# Patient Record
Sex: FEMALE | Race: WHITE | NOT HISPANIC OR LATINO | Employment: STUDENT | URBAN - METROPOLITAN AREA
[De-identification: names, ages, dates, MRNs, and addresses within clinical notes are randomized per-mention and may not be internally consistent; named-entity substitution may affect disease eponyms.]

---
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
## 2021-04-16 ENCOUNTER — PATIENT MESSAGE (OUTPATIENT)
Dept: RESEARCH | Facility: HOSPITAL | Age: 21
End: 2021-04-16

## 2021-04-28 NOTE — TELEPHONE ENCOUNTER
Please advise.    Pt presents to ED with c/o of right heel pain due to possible overuse x3 months. States pain has not gotten better. pms intact. Resp even and unlabored. A/ox4. No acute distress noted. Denies any need at this time. Call light within reach. Bed in lowest position. Will continue to monitor.

## 2021-12-17 RX ORDER — VALACYCLOVIR HYDROCHLORIDE 1 G/1
2000 TABLET, FILM COATED ORAL 2 TIMES DAILY
Qty: 4 TABLET | Refills: 5 | Status: SHIPPED | OUTPATIENT
Start: 2021-12-17 | End: 2023-01-08 | Stop reason: SDUPTHER

## 2022-08-22 RX ORDER — AZITHROMYCIN 250 MG/1
TABLET, FILM COATED ORAL
Qty: 6 TABLET | Refills: 0 | Status: SHIPPED | OUTPATIENT
Start: 2022-08-22 | End: 2023-06-13

## 2023-01-20 ENCOUNTER — OFFICE VISIT (OUTPATIENT)
Dept: OPHTHALMOLOGY | Facility: CLINIC | Age: 23
End: 2023-01-20
Payer: COMMERCIAL

## 2023-01-20 DIAGNOSIS — Z01.00 ENCOUNTER FOR EYE EXAM: Primary | ICD-10-CM

## 2023-01-20 DIAGNOSIS — H52.13 MYOPIA OF BOTH EYES: ICD-10-CM

## 2023-01-20 PROCEDURE — 92004 PR EYE EXAM, NEW PATIENT,COMPREHESV: ICD-10-PCS | Mod: S$GLB,,, | Performed by: OPTOMETRIST

## 2023-01-20 PROCEDURE — 92015 PR REFRACTION: ICD-10-PCS | Mod: S$GLB,,, | Performed by: OPTOMETRIST

## 2023-01-20 PROCEDURE — 99999 PR PBB SHADOW E&M-EST. PATIENT-LVL II: CPT | Mod: PBBFAC,,, | Performed by: OPTOMETRIST

## 2023-01-20 PROCEDURE — 92004 COMPRE OPH EXAM NEW PT 1/>: CPT | Mod: S$GLB,,, | Performed by: OPTOMETRIST

## 2023-01-20 PROCEDURE — 92015 DETERMINE REFRACTIVE STATE: CPT | Mod: S$GLB,,, | Performed by: OPTOMETRIST

## 2023-01-20 PROCEDURE — 1159F MED LIST DOCD IN RCRD: CPT | Mod: CPTII,S$GLB,, | Performed by: OPTOMETRIST

## 2023-01-20 PROCEDURE — 1159F PR MEDICATION LIST DOCUMENTED IN MEDICAL RECORD: ICD-10-PCS | Mod: CPTII,S$GLB,, | Performed by: OPTOMETRIST

## 2023-01-20 PROCEDURE — 99999 PR PBB SHADOW E&M-EST. PATIENT-LVL II: ICD-10-PCS | Mod: PBBFAC,,, | Performed by: OPTOMETRIST

## 2023-01-20 NOTE — PROGRESS NOTES
HPI     Annual Exam            Comments: NP          Comments    Patient here today for yearly eye exam  Vision changes since last eye exam?: Yes OD blurred  No correction     Any eye pain today: No    Other ocular symptoms: No    Interested in contact lens fitting today? No                      Last edited by Na Lau, PCT on 1/20/2023  8:30 AM.            Assessment /Plan     For exam results, see Encounter Report.    Encounter for eye exam    Myopia of both eyes    Subclinical Mrx, given PRN for distance  Eyeglass Final Rx       Eyeglass Final Rx         Sphere Cylinder Axis    Right -0.75 +0.25 100    Left Fords +0.25 105      Type: SVL    Expiration Date: 1/20/2024                    RTC 1 yr for dilated eye exam or sooner if any changes to vision.   Discussed above and answered questions.

## 2023-06-13 ENCOUNTER — TELEPHONE (OUTPATIENT)
Dept: PRIMARY CARE CLINIC | Facility: CLINIC | Age: 23
End: 2023-06-13

## 2023-06-13 ENCOUNTER — OFFICE VISIT (OUTPATIENT)
Dept: PRIMARY CARE CLINIC | Facility: CLINIC | Age: 23
End: 2023-06-13
Payer: COMMERCIAL

## 2023-06-13 VITALS
DIASTOLIC BLOOD PRESSURE: 62 MMHG | WEIGHT: 143.06 LBS | SYSTOLIC BLOOD PRESSURE: 102 MMHG | OXYGEN SATURATION: 99 % | TEMPERATURE: 99 F | BODY MASS INDEX: 22.99 KG/M2 | HEART RATE: 67 BPM | HEIGHT: 66 IN

## 2023-06-13 DIAGNOSIS — J02.9 SORE THROAT: ICD-10-CM

## 2023-06-13 DIAGNOSIS — H93.8X2 EAR PRESSURE, LEFT: ICD-10-CM

## 2023-06-13 DIAGNOSIS — J03.90 TONSILLITIS: Primary | ICD-10-CM

## 2023-06-13 PROCEDURE — 1159F MED LIST DOCD IN RCRD: CPT | Mod: CPTII,S$GLB,, | Performed by: FAMILY MEDICINE

## 2023-06-13 PROCEDURE — 3074F SYST BP LT 130 MM HG: CPT | Mod: CPTII,S$GLB,, | Performed by: FAMILY MEDICINE

## 2023-06-13 PROCEDURE — 3008F PR BODY MASS INDEX (BMI) DOCUMENTED: ICD-10-PCS | Mod: CPTII,S$GLB,, | Performed by: FAMILY MEDICINE

## 2023-06-13 PROCEDURE — 99214 PR OFFICE/OUTPT VISIT, EST, LEVL IV, 30-39 MIN: ICD-10-PCS | Mod: S$GLB,,, | Performed by: FAMILY MEDICINE

## 2023-06-13 PROCEDURE — 3008F BODY MASS INDEX DOCD: CPT | Mod: CPTII,S$GLB,, | Performed by: FAMILY MEDICINE

## 2023-06-13 PROCEDURE — 99999 PR PBB SHADOW E&M-EST. PATIENT-LVL III: CPT | Mod: PBBFAC,,, | Performed by: FAMILY MEDICINE

## 2023-06-13 PROCEDURE — 3078F DIAST BP <80 MM HG: CPT | Mod: CPTII,S$GLB,, | Performed by: FAMILY MEDICINE

## 2023-06-13 PROCEDURE — 99999 PR PBB SHADOW E&M-EST. PATIENT-LVL III: ICD-10-PCS | Mod: PBBFAC,,, | Performed by: FAMILY MEDICINE

## 2023-06-13 PROCEDURE — 3078F PR MOST RECENT DIASTOLIC BLOOD PRESSURE < 80 MM HG: ICD-10-PCS | Mod: CPTII,S$GLB,, | Performed by: FAMILY MEDICINE

## 2023-06-13 PROCEDURE — 3074F PR MOST RECENT SYSTOLIC BLOOD PRESSURE < 130 MM HG: ICD-10-PCS | Mod: CPTII,S$GLB,, | Performed by: FAMILY MEDICINE

## 2023-06-13 PROCEDURE — 1159F PR MEDICATION LIST DOCUMENTED IN MEDICAL RECORD: ICD-10-PCS | Mod: CPTII,S$GLB,, | Performed by: FAMILY MEDICINE

## 2023-06-13 PROCEDURE — 1160F RVW MEDS BY RX/DR IN RCRD: CPT | Mod: CPTII,S$GLB,, | Performed by: FAMILY MEDICINE

## 2023-06-13 PROCEDURE — 1160F PR REVIEW ALL MEDS BY PRESCRIBER/CLIN PHARMACIST DOCUMENTED: ICD-10-PCS | Mod: CPTII,S$GLB,, | Performed by: FAMILY MEDICINE

## 2023-06-13 PROCEDURE — 99214 OFFICE O/P EST MOD 30 MIN: CPT | Mod: S$GLB,,, | Performed by: FAMILY MEDICINE

## 2023-06-13 RX ORDER — AMOXICILLIN 500 MG/1
500 CAPSULE ORAL 2 TIMES DAILY
Qty: 20 CAPSULE | Refills: 0 | Status: SHIPPED | OUTPATIENT
Start: 2023-06-13 | End: 2023-06-23

## 2023-06-13 RX ORDER — IBUPROFEN 200 MG
200 TABLET ORAL EVERY 6 HOURS PRN
COMMUNITY

## 2023-06-13 NOTE — PROGRESS NOTES
Assessment:     1. Tonsillitis    2. Sore throat    3. Ear pressure, left      Plan:     Orders Placed This Encounter    amoxicillin (AMOXIL) 500 MG capsule   BID x 10d  Come in for evaluation if not better,   To ER if fever, can't swallow - risk of tonsillar abscess    There are no Patient Instructions on file for this visit.    Patient ID: Elizabeth Keyes is a 23 y.o. female.    Here today for sore throat, began Sat, then pressure in L ear. 4d. Took lots of Ibuprofen 6 in a day, & OTC meds. Felt worse this am. No fever. Some pain w swallowing. My tonsil on L  feels like it's swollen. The flap before it feels sore & it was red yesterday.     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 No cough    Current Outpatient Medications   Medication Instructions    amoxicillin (AMOXIL) 500 mg, Oral, 2 times daily    ibuprofen (ADVIL,MOTRIN) 200 mg, Oral, Every 6 hours PRN       No results found for: HGBA1C  No results found for: MICALBCREAT  No results found for: LDLCALC, CHOL, HDL, TRIG    No results found for: NA, K, CL, CO2, GLU, BUN, CREATININE, CALCIUM, PROT, ALBUMIN, BILITOT, ALKPHOS, AST, ALT, ANIONGAP, ESTGFRAFRICA, EGFRNONAA, WBC, HGB, HCT, MCV, PLT, TSH, PSA, PSADIAG, HEPCAB    No results found for: LH, FSH, TOTALTESTOST, PROGESTERONE, ESTRADIOL, [id number], MRGOLCOX50, FERRITIN, IRON, TRANSFERRIN, TIBC, FESATURATED, ZINC      Past Medical History:   Diagnosis Date    Fever blister      History reviewed. No pertinent surgical history.  Social History     Social History Narrative    Lives with mom, dad, 2 brothers and 1 sister.  2 dogs.  [employer].    She will start 9 th grade at [employer].     Family History   Problem Relation Age of Onset    Asthma Sister     Eczema Sister     AVM Brother         s/p surgical removal    Early death Neg Hx      Vitals:    06/13/23 1017   BP: 102/62   Pulse: 67   Temp: 98.6 °F (37 °C)   TempSrc: Oral   SpO2: 99%   Weight: 64.9 kg (143 lb 1.3  "oz)   Height: 5' 6" (1.676 m)   PainSc:   5   PainLoc: Ear     Objective:   Physical Exam  Vitals and nursing note reviewed.   Constitutional:       General: She is not in acute distress.     Appearance: She is well-developed.   HENT:      Right Ear: Tympanic membrane normal.      Left Ear: Tympanic membrane normal.      Ears:      Comments: Moderate cerumen bilateral     Nose: Mucosal edema present. No rhinorrhea.      Right Sinus: No maxillary sinus tenderness or frontal sinus tenderness.      Left Sinus: No maxillary sinus tenderness or frontal sinus tenderness.      Mouth/Throat:      Pharynx: Posterior oropharyngeal erythema present. No oropharyngeal exudate.      Comments: Swollen erythematous L tonsil pushing the uvula to the Right, no exudate, no ulceration  Eyes:      Pupils: Pupils are equal, round, and reactive to light.   Neck:      Thyroid: No thyromegaly.   Cardiovascular:      Rate and Rhythm: Normal rate and regular rhythm.      Heart sounds: Normal heart sounds. No murmur heard.  Pulmonary:      Effort: Pulmonary effort is normal.      Breath sounds: Normal breath sounds. No wheezing or rales.   Musculoskeletal:      Cervical back: Normal range of motion and neck supple.   Lymphadenopathy:      Cervical: No cervical adenopathy.   Skin:     General: Skin is warm and dry.                                   "

## 2023-07-16 RX ORDER — VALACYCLOVIR HYDROCHLORIDE 1 G/1
2000 TABLET, FILM COATED ORAL 2 TIMES DAILY
Qty: 20 TABLET | Refills: 0 | Status: SHIPPED | OUTPATIENT
Start: 2023-07-16

## 2023-12-12 ENCOUNTER — OFFICE VISIT (OUTPATIENT)
Dept: DERMATOLOGY | Facility: CLINIC | Age: 23
End: 2023-12-12
Payer: COMMERCIAL

## 2023-12-12 DIAGNOSIS — Z76.89 ENCOUNTER FOR SKIN CARE: ICD-10-CM

## 2023-12-12 DIAGNOSIS — L85.8 KP (KERATOSIS PILARIS): ICD-10-CM

## 2023-12-12 DIAGNOSIS — L70.9 ACNE, UNSPECIFIED ACNE TYPE: Primary | ICD-10-CM

## 2023-12-12 PROCEDURE — 1159F PR MEDICATION LIST DOCUMENTED IN MEDICAL RECORD: ICD-10-PCS | Mod: CPTII,95,, | Performed by: DERMATOLOGY

## 2023-12-12 PROCEDURE — 99204 PR OFFICE/OUTPT VISIT, NEW, LEVL IV, 45-59 MIN: ICD-10-PCS | Mod: 95,,, | Performed by: DERMATOLOGY

## 2023-12-12 PROCEDURE — 1160F PR REVIEW ALL MEDS BY PRESCRIBER/CLIN PHARMACIST DOCUMENTED: ICD-10-PCS | Mod: CPTII,95,, | Performed by: DERMATOLOGY

## 2023-12-12 PROCEDURE — 1159F MED LIST DOCD IN RCRD: CPT | Mod: CPTII,95,, | Performed by: DERMATOLOGY

## 2023-12-12 PROCEDURE — 99204 OFFICE O/P NEW MOD 45 MIN: CPT | Mod: 95,,, | Performed by: DERMATOLOGY

## 2023-12-12 PROCEDURE — 1160F RVW MEDS BY RX/DR IN RCRD: CPT | Mod: CPTII,95,, | Performed by: DERMATOLOGY

## 2023-12-12 RX ORDER — ERYTHROMYCIN 20 MG/G
GEL TOPICAL
Qty: 30 G | Refills: 2 | Status: SHIPPED | OUTPATIENT
Start: 2023-12-12 | End: 2024-03-07 | Stop reason: SDUPTHER

## 2023-12-12 RX ORDER — TRETINOIN 0.5 MG/G
CREAM TOPICAL NIGHTLY
Qty: 20 G | Refills: 3 | Status: SHIPPED | OUTPATIENT
Start: 2023-12-12 | End: 2024-03-07 | Stop reason: SDUPTHER

## 2023-12-12 NOTE — PATIENT INSTRUCTIONS
Etiology of keratosis pilaris discussed and explained the dry skin plugging of the pores.  Recommended salicylic acid washes for now with avoidance of excessively hot water bathing on affected areas.  Use a scrub brush and a loofa.  Can consider moisturizers and natural oils later.  Consider gluten free diet as it may help.    Shower sooner than later after exercise, exertion, or sweating.  Can do wash cloth wipes if more convenient.  Sweat can cause irritation and may exacerbate skin conditions.  Use corn starch as a drying powder.    Patient to start using sulfur bar soap for the face or affected area 1-2 x day.  For scalp usage lather from the soap to be applied to the roots of the scalp and allow to sit for 3-5 minutes regularly.  Same instructions for other affected areas.    Long term and slow treatment treatment required for acne discussed today.  Gentle skin care with avoidance of hot water and usage of oil free products discussed.  Avoidance of lotions and make up discussed in addition to all other products.  Oil free sun block if needed.  Try to use clothing and hats to avoid extra products occluding the pores.  Compliance with prescribed regimen discussed.  No picking or squeezing of acne lesions discussed.  Focal coconut oil or jojoba oil as needed for dry areas.  Hold acne topicals if skin is getting too dry.  Resume when ready.

## 2023-12-12 NOTE — PROGRESS NOTES
The patient location is: home  The chief complaint leading to consultation is: acne and break outs of the back    Visit type: audiovisual    Face to Face time with patient: 6.5 m  9 minutes of total time spent on the encounter, which includes face to face time and non-face to face time preparing to see the patient (eg, review of tests), Obtaining and/or reviewing separately obtained history, Documenting clinical information in the electronic or other health record, Independently interpreting results (not separately reported) and communicating results to the patient/family/caregiver, or Care coordination (not separately reported).         Each patient to whom he or she provides medical services by telemedicine is:  (1) informed of the relationship between the physician and patient and the respective role of any other health care provider with respect to management of the patient; and (2) notified that he or she may decline to receive medical services by telemedicine and may withdraw from such care at any time.    Notes:     Subjective:      Patient ID:  Elizabeth Keyes is a 23 y.o. female who presents for No chief complaint on file.    HPI    Review of Systems   Constitutional:  Negative for fever.   HENT:  Negative for sore throat.    Respiratory:  Negative for cough.        Objective:   Physical Exam   Constitutional: She appears well-developed and well-nourished.   Eyes: No conjunctival no injection.   Neurological: She is alert and oriented to person, place, and time. She is not disoriented.   Psychiatric: She has a normal mood and affect.   Skin:   Areas Examined (abnormalities noted in diagram):   Head / Face Inspection Performed       Diagram Legend     Erythematous scaling macule/papule c/w actinic keratosis       Vascular papule c/w angioma      Pigmented verrucoid papule/plaque c/w seborrheic keratosis      Yellow umbilicated papule c/w sebaceous hyperplasia      Irregularly shaped tan macule c/w  lentigo     1-2 mm smooth white papules consistent with Milia      Movable subcutaneous cyst with punctum c/w epidermal inclusion cyst      Subcutaneous movable cyst c/w pilar cyst      Firm pink to brown papule c/w dermatofibroma      Pedunculated fleshy papule(s) c/w skin tag(s)      Evenly pigmented macule c/w junctional nevus     Mildly variegated pigmented, slightly irregular-bordered macule c/w mildly atypical nevus      Flesh colored to evenly pigmented papule c/w intradermal nevus       Pink pearly papule/plaque c/w basal cell carcinoma      Erythematous hyperkeratotic cursted plaque c/w SCC      Surgical scar with no sign of skin cancer recurrence      Open and closed comedones      Inflammatory papules and pustules      Verrucoid papule consistent consistent with wart     Erythematous eczematous patches and plaques     Dystrophic onycholytic nail with subungual debris c/w onychomycosis     Umbilicated papule    Erythematous-base heme-crusted tan verrucoid plaque consistent with inflamed seborrheic keratosis     Erythematous Silvery Scaling Plaque c/w Psoriasis     See annotation                Assessment / Plan:        Acne, unspecified acne type  -     tretinoin (RETIN-A) 0.05 % cream; Apply topically every evening. Start with every other night and move up to nightly after 2 weeks if not too dry.  Dispense: 20 g; Refill: 3  -     erythromycin with ethanoL (EMGEL) 2 % gel; AAA face qam to break out areas  Dispense: 30 g; Refill: 2  Long term and slow treatment treatment required for acne discussed today.  Gentle skin care with avoidance of hot water and usage of oil free products discussed.  Avoidance of lotions and make up discussed in addition to all other products.  Oil free sun block if needed.  Try to use clothing and hats to avoid extra products occluding the pores.  Compliance with prescribed regimen discussed.  No picking or squeezing of acne lesions discussed.  Focal coconut oil or jojoba oil as  needed for dry areas.  Hold acne topicals if skin is getting too dry.  Resume when ready.  Reviewed with patient different treatment options and associated risks.  Patient to start using sulfur bar soap for the face or affected area 1-2 x day.  For scalp usage lather from the soap to be applied to the roots of the scalp and allow to sit for 3-5 minutes regularly.  Same instructions for other affected areas.  No pregnancy with medications reviewed.    Encounter for skin care  Shower sooner than later after exercise, exertion, or sweating.  Can do wash cloth wipes if more convenient.  Sweat can cause irritation and may exacerbate skin conditions.  Use corn starch as a drying powder.    KP (keratosis pilaris)  Etiology of keratosis pilaris discussed and explained the dry skin plugging of the pores.  Recommended salicylic acid washes for now with avoidance of excessively hot water bathing on affected areas.  Use a scrub brush and a loofa.  Can consider moisturizers and natural oils later.  Consider gluten free diet as it may help.  Chronic nature of this condition discussed with patient.  Reviewed with patient different treatment options and associated risks.             Follow up in about 6 months (around 6/12/2024).

## 2024-03-07 DIAGNOSIS — L70.9 ACNE, UNSPECIFIED ACNE TYPE: ICD-10-CM

## 2024-03-07 RX ORDER — TRETINOIN 0.5 MG/G
CREAM TOPICAL NIGHTLY
Qty: 20 G | Refills: 1 | Status: SHIPPED | OUTPATIENT
Start: 2024-03-07

## 2024-03-07 RX ORDER — ERYTHROMYCIN 20 MG/G
GEL TOPICAL
Qty: 30 G | Refills: 1 | Status: SHIPPED | OUTPATIENT
Start: 2024-03-07

## 2024-10-04 ENCOUNTER — OFFICE VISIT (OUTPATIENT)
Dept: URGENT CARE | Facility: CLINIC | Age: 24
End: 2024-10-04
Payer: COMMERCIAL

## 2024-10-04 VITALS
TEMPERATURE: 98 F | HEART RATE: 66 BPM | RESPIRATION RATE: 16 BRPM | HEIGHT: 66 IN | OXYGEN SATURATION: 99 % | DIASTOLIC BLOOD PRESSURE: 71 MMHG | WEIGHT: 145 LBS | SYSTOLIC BLOOD PRESSURE: 108 MMHG | BODY MASS INDEX: 23.3 KG/M2

## 2024-10-04 DIAGNOSIS — J00 ACUTE NASOPHARYNGITIS: Primary | ICD-10-CM

## 2024-10-04 RX ORDER — GUAIFENESIN AND DEXTROMETHORPHAN HYDROBROMIDE 10; 100 MG/5ML; MG/5ML
5 SYRUP ORAL EVERY 6 HOURS
Qty: 50 ML | Refills: 0 | Status: SHIPPED | OUTPATIENT
Start: 2024-10-04 | End: 2024-10-14

## 2024-10-04 RX ORDER — BENZONATATE 100 MG/1
100 CAPSULE ORAL EVERY 6 HOURS PRN
Qty: 30 CAPSULE | Refills: 1 | Status: SHIPPED | OUTPATIENT
Start: 2024-10-04 | End: 2025-10-04

## 2024-10-04 RX ORDER — IPRATROPIUM BROMIDE 21 UG/1
2 SPRAY, METERED NASAL 3 TIMES DAILY
Qty: 30 ML | Refills: 0 | Status: SHIPPED | OUTPATIENT
Start: 2024-10-04 | End: 2024-10-14

## 2024-10-04 NOTE — PROGRESS NOTES
"Subjective:      Patient ID: Elizabeth Keyes is a 24 y.o. female.    Vitals:  height is 5' 6" (1.676 m) and weight is 65.8 kg (145 lb). Her oral temperature is 98.4 °F (36.9 °C). Her blood pressure is 108/71 and her pulse is 66. Her respiration is 16 and oxygen saturation is 99%.     Chief Complaint: Sore Throat    This is a 24 y.o. female who presents today with a chief complaint of L side sore throat and ear pressure that started on Wednesday. Pt took tylenol       Sore Throat   This is a new problem. The current episode started in the past 7 days. The problem has been unchanged. The pain is worse on the left side. There has been no fever. The pain is at a severity of 4/10. The pain is mild. Associated symptoms include ear pain and a hoarse voice. Pertinent negatives include no abdominal pain, congestion, coughing, diarrhea, drooling, ear discharge, headaches, plugged ear sensation, neck pain, shortness of breath, stridor, swollen glands, trouble swallowing or vomiting. She has tried acetaminophen for the symptoms.       HENT:  Positive for ear pain and sore throat. Negative for ear discharge, drooling, congestion and trouble swallowing.    Neck: Negative for neck pain.   Respiratory:  Negative for cough, shortness of breath and stridor.    Gastrointestinal:  Negative for abdominal pain, vomiting and diarrhea.   Neurological:  Negative for headaches.      Objective:     Physical Exam   Constitutional: She is oriented to person, place, and time. She appears well-developed. She is cooperative.  Non-toxic appearance. She does not appear ill. No distress.   HENT:   Head: Normocephalic and atraumatic.   Ears:   Right Ear: Hearing, external ear and ear canal normal. Tympanic membrane is erythematous and bulging.   Left Ear: Hearing, external ear and ear canal normal. Tympanic membrane is erythematous and bulging.   Nose: No mucosal edema, rhinorrhea or nasal deformity. No epistaxis. Right sinus exhibits frontal sinus " tenderness. Right sinus exhibits no maxillary sinus tenderness. Left sinus exhibits frontal sinus tenderness. Left sinus exhibits no maxillary sinus tenderness.   Mouth/Throat: Uvula is midline and mucous membranes are normal. No trismus in the jaw. Normal dentition. No uvula swelling. Posterior oropharyngeal erythema present. No oropharyngeal exudate or posterior oropharyngeal edema.   Eyes: Conjunctivae and lids are normal. No scleral icterus.   Neck: Trachea normal and phonation normal. Neck supple. No edema present. No erythema present. No neck rigidity present.   Cardiovascular: Normal rate, regular rhythm, normal heart sounds and normal pulses.   Pulmonary/Chest: Effort normal and breath sounds normal. No respiratory distress. She has no decreased breath sounds. She has no rhonchi.   Abdominal: Normal appearance.   Musculoskeletal: Normal range of motion.         General: No deformity. Normal range of motion.   Neurological: She is alert and oriented to person, place, and time. She exhibits normal muscle tone. Coordination normal.   Skin: Skin is warm, dry, intact, not diaphoretic and not pale.   Psychiatric: Her speech is normal and behavior is normal. Judgment and thought content normal.   Nursing note and vitals reviewed.      Assessment:     1. Acute nasopharyngitis        Plan:       Acute nasopharyngitis  -     ipratropium (ATROVENT) 21 mcg (0.03 %) nasal spray; 2 sprays by Each Nostril route 3 (three) times daily. for 10 days  Dispense: 30 mL; Refill: 0  -     benzonatate (TESSALON PERLES) 100 MG capsule; Take 1 capsule (100 mg total) by mouth every 6 (six) hours as needed for Cough.  Dispense: 30 capsule; Refill: 1  -     dextromethorphan-guaiFENesin  mg/5 ml (ROBITUSSIN-DM)  mg/5 mL liquid; Take 5 mLs by mouth every 6 (six) hours. for 10 days  Dispense: 50 mL; Refill: 0           Please drink plenty of fluids.  Please get plenty of rest.  Please return here or go to the Emergency  Department for any concerns or worsening of condition.  If you were given wait & see antibiotics, please wait 3-5 days before taking them, and only take them if your symptoms have worsened or not improved.  If you do begin taking the antibiotics, please take them to completion.  If you were prescribed antibiotics, please take them to completion.  If you were prescribed a narcotic medication, do not drive or operate heavy equipment or machinery while taking these medications.  If you do not have Hypertension or any history of palpitations, it is ok to take over the counter Sudafed or Mucinex D or Allegra-D or Claritin-D or Zyrtec-D.  If you do take one of the above, it is ok to combine that with plain over the counter Mucinex or Allegra or Claritin or Zyrtec.  If for example you are taking Zyrtec -D, you can combine that with Mucinex, but not Mucinex-D.  If you are taking Mucinex-D, you can combine that with plain Allegra or Claritin or Zyrtec.   If you do have Hypertension or palpitations, it is safe to take Coricidin HBP for relief of sinus symptoms.  We recommend you take over the counter Flonase (Fluticasone) or another nasally inhaled steroid unless you are already taking one.  Nasal irrigation with a saline spray or Netti Pot like device per their directions is also recommended.  If not allergic, please take over the counter Tylenol (Acetaminophen) and/or Motrin (Ibuprofen) as directed for control of pain and/or fever.  Please follow up with your primary care doctor or specialist as needed.    If you  smoke, please stop smoking.